# Patient Record
Sex: MALE | Race: WHITE | RURAL
[De-identification: names, ages, dates, MRNs, and addresses within clinical notes are randomized per-mention and may not be internally consistent; named-entity substitution may affect disease eponyms.]

---

## 2019-03-14 PROBLEM — N52.9 MALE ERECTILE DISORDER: Status: ACTIVE | Noted: 2018-07-17

## 2019-03-14 PROBLEM — L71.9 ROSACEA: Status: ACTIVE | Noted: 2018-01-25

## 2019-03-14 PROBLEM — G25.81 RESTLESS LEG SYNDROME: Status: ACTIVE | Noted: 2017-05-05

## 2019-03-14 PROBLEM — I77.9 LEFT-SIDED CAROTID ARTERY DISEASE (HCC): Status: ACTIVE | Noted: 2017-11-08

## 2019-03-14 PROBLEM — K52.9 CHRONIC DIARRHEA: Status: ACTIVE | Noted: 2018-07-17

## 2022-09-01 ENCOUNTER — TELEPHONE (OUTPATIENT)
Dept: PRIMARY CARE CLINIC | Age: 72
End: 2022-09-01

## 2022-09-01 NOTE — TELEPHONE ENCOUNTER
Pt received a vm, but is requesting a call back to obtain clarity on what his next steps will need to be. Stated he needs to be seen within [address] Denise Donnelly. I did advise of upcoming appt 10/31/22, pt stated again that Dr. Danielle Mcbride says he needs to be seen way sooner than that and that he will need lab work done and to see Dr. Denise Donnelly all within the next two weeks. Please contact pt to advise [phone]. Thank you.

## 2022-09-01 NOTE — TELEPHONE ENCOUNTER
I called and informed per KATIE response he is scheduled for 09/08/2022 for repeat PSA and advised we would go from there as far as sooner work in